# Patient Record
Sex: FEMALE | Race: WHITE | Employment: OTHER | URBAN - METROPOLITAN AREA
[De-identification: names, ages, dates, MRNs, and addresses within clinical notes are randomized per-mention and may not be internally consistent; named-entity substitution may affect disease eponyms.]

---

## 2017-01-04 ENCOUNTER — OFFICE VISIT (OUTPATIENT)
Dept: DERMATOLOGY | Age: 77
End: 2017-01-04

## 2017-01-04 DIAGNOSIS — D22.9 BENIGN NEVUS: ICD-10-CM

## 2017-01-04 DIAGNOSIS — L66.1 LICHEN PLANO-PILARIS: ICD-10-CM

## 2017-01-04 DIAGNOSIS — L57.0 AK (ACTINIC KERATOSIS): Primary | ICD-10-CM

## 2017-01-04 DIAGNOSIS — L71.9 ROSACEA: ICD-10-CM

## 2017-01-04 DIAGNOSIS — Z85.828 HISTORY OF NONMELANOMA SKIN CANCER: ICD-10-CM

## 2017-01-04 PROCEDURE — 17003 DESTRUCT PREMALG LES 2-14: CPT | Performed by: DERMATOLOGY

## 2017-01-04 PROCEDURE — 99202 OFFICE O/P NEW SF 15 MIN: CPT | Performed by: DERMATOLOGY

## 2017-01-04 PROCEDURE — 17000 DESTRUCT PREMALG LESION: CPT | Performed by: DERMATOLOGY

## 2017-07-26 ENCOUNTER — OFFICE VISIT (OUTPATIENT)
Dept: DERMATOLOGY | Age: 77
End: 2017-07-26

## 2017-07-26 DIAGNOSIS — L60.3 ONYCHODYSTROPHY: ICD-10-CM

## 2017-07-26 DIAGNOSIS — L66.1 LICHEN PLANO-PILARIS: ICD-10-CM

## 2017-07-26 DIAGNOSIS — D22.9 BENIGN NEVUS: ICD-10-CM

## 2017-07-26 DIAGNOSIS — L57.0 AK (ACTINIC KERATOSIS): Primary | ICD-10-CM

## 2017-07-26 DIAGNOSIS — Z85.828 HISTORY OF NONMELANOMA SKIN CANCER: ICD-10-CM

## 2017-07-26 PROCEDURE — 1090F PRES/ABSN URINE INCON ASSESS: CPT | Performed by: DERMATOLOGY

## 2017-07-26 PROCEDURE — 17000 DESTRUCT PREMALG LESION: CPT | Performed by: DERMATOLOGY

## 2017-07-26 PROCEDURE — 4040F PNEUMOC VAC/ADMIN/RCVD: CPT | Performed by: DERMATOLOGY

## 2017-07-26 PROCEDURE — G8427 DOCREV CUR MEDS BY ELIG CLIN: HCPCS | Performed by: DERMATOLOGY

## 2017-07-26 PROCEDURE — G8421 BMI NOT CALCULATED: HCPCS | Performed by: DERMATOLOGY

## 2017-07-26 PROCEDURE — 17003 DESTRUCT PREMALG LES 2-14: CPT | Performed by: DERMATOLOGY

## 2017-07-26 PROCEDURE — 1036F TOBACCO NON-USER: CPT | Performed by: DERMATOLOGY

## 2017-07-26 PROCEDURE — 99214 OFFICE O/P EST MOD 30 MIN: CPT | Performed by: DERMATOLOGY

## 2017-07-26 PROCEDURE — G8400 PT W/DXA NO RESULTS DOC: HCPCS | Performed by: DERMATOLOGY

## 2017-07-26 PROCEDURE — 1123F ACP DISCUSS/DSCN MKR DOCD: CPT | Performed by: DERMATOLOGY

## 2017-07-26 RX ORDER — PIMECROLIMUS 10 MG/G
CREAM TOPICAL 2 TIMES DAILY
COMMUNITY
End: 2019-03-13

## 2017-07-26 RX ORDER — VITAMIN B COMPLEX
1 CAPSULE ORAL DAILY
COMMUNITY

## 2017-07-26 RX ORDER — TACROLIMUS 1 MG/G
OINTMENT TOPICAL 2 TIMES DAILY
COMMUNITY
End: 2019-03-13

## 2017-07-26 RX ORDER — METHYLPHENIDATE HYDROCHLORIDE 20 MG/1
20 TABLET ORAL 2 TIMES DAILY
COMMUNITY

## 2017-10-23 ENCOUNTER — HOSPITAL ENCOUNTER (OUTPATIENT)
Dept: MAMMOGRAPHY | Age: 77
Discharge: HOME | End: 2017-10-23
Attending: INTERNAL MEDICINE | Admitting: INTERNAL MEDICINE

## 2017-10-23 DIAGNOSIS — Z12.31 VISIT FOR SCREENING MAMMOGRAM: ICD-10-CM

## 2018-01-29 ENCOUNTER — OFFICE VISIT (OUTPATIENT)
Dept: DERMATOLOGY | Age: 78
End: 2018-01-29

## 2018-01-29 DIAGNOSIS — D22.9 BENIGN NEVUS: ICD-10-CM

## 2018-01-29 DIAGNOSIS — L71.9 ROSACEA: ICD-10-CM

## 2018-01-29 DIAGNOSIS — Z85.828 HISTORY OF NONMELANOMA SKIN CANCER: ICD-10-CM

## 2018-01-29 DIAGNOSIS — L66.1 LICHEN PLANO-PILARIS: ICD-10-CM

## 2018-01-29 DIAGNOSIS — L57.0 AK (ACTINIC KERATOSIS): Primary | ICD-10-CM

## 2018-01-29 DIAGNOSIS — L85.3 XEROSIS CUTIS: ICD-10-CM

## 2018-01-29 PROCEDURE — 17003 DESTRUCT PREMALG LES 2-14: CPT | Performed by: DERMATOLOGY

## 2018-01-29 PROCEDURE — 1123F ACP DISCUSS/DSCN MKR DOCD: CPT | Performed by: DERMATOLOGY

## 2018-01-29 PROCEDURE — G8484 FLU IMMUNIZE NO ADMIN: HCPCS | Performed by: DERMATOLOGY

## 2018-01-29 PROCEDURE — 1090F PRES/ABSN URINE INCON ASSESS: CPT | Performed by: DERMATOLOGY

## 2018-01-29 PROCEDURE — G8427 DOCREV CUR MEDS BY ELIG CLIN: HCPCS | Performed by: DERMATOLOGY

## 2018-01-29 PROCEDURE — G8421 BMI NOT CALCULATED: HCPCS | Performed by: DERMATOLOGY

## 2018-01-29 PROCEDURE — 99214 OFFICE O/P EST MOD 30 MIN: CPT | Performed by: DERMATOLOGY

## 2018-01-29 PROCEDURE — 4040F PNEUMOC VAC/ADMIN/RCVD: CPT | Performed by: DERMATOLOGY

## 2018-01-29 PROCEDURE — G8400 PT W/DXA NO RESULTS DOC: HCPCS | Performed by: DERMATOLOGY

## 2018-01-29 PROCEDURE — 17000 DESTRUCT PREMALG LESION: CPT | Performed by: DERMATOLOGY

## 2018-01-29 PROCEDURE — 1036F TOBACCO NON-USER: CPT | Performed by: DERMATOLOGY

## 2018-01-29 NOTE — PROGRESS NOTES
cutis -Gave suggestions for over-the-counter moisturizers    5. Rosacea -Discussed treatment options. No topical treatment for now unless she develops more inflammatory papules. 6. History of nonmelanoma skin cancer - No evidence of recurrence. Discussed risk of subsequent skin cancers and increased risk of melanoma. Reviewed importance of monitoring skin for change and sun protection with hats and sunscreen, as well as sun avoidance.

## 2018-02-12 ENCOUNTER — TELEPHONE (OUTPATIENT)
Dept: ADMINISTRATIVE | Age: 78
End: 2018-02-12

## 2018-09-19 ENCOUNTER — OFFICE VISIT (OUTPATIENT)
Dept: DERMATOLOGY | Age: 78
End: 2018-09-19

## 2018-09-19 DIAGNOSIS — L82.1 SEBORRHEIC KERATOSES: ICD-10-CM

## 2018-09-19 DIAGNOSIS — Z85.828 HISTORY OF NONMELANOMA SKIN CANCER: ICD-10-CM

## 2018-09-19 DIAGNOSIS — D22.9 BENIGN NEVUS: Primary | ICD-10-CM

## 2018-09-19 DIAGNOSIS — L66.1 LICHEN PLANO-PILARIS: ICD-10-CM

## 2018-09-19 PROCEDURE — 1090F PRES/ABSN URINE INCON ASSESS: CPT | Performed by: DERMATOLOGY

## 2018-09-19 PROCEDURE — G8400 PT W/DXA NO RESULTS DOC: HCPCS | Performed by: DERMATOLOGY

## 2018-09-19 PROCEDURE — 4040F PNEUMOC VAC/ADMIN/RCVD: CPT | Performed by: DERMATOLOGY

## 2018-09-19 PROCEDURE — G8421 BMI NOT CALCULATED: HCPCS | Performed by: DERMATOLOGY

## 2018-09-19 PROCEDURE — G8427 DOCREV CUR MEDS BY ELIG CLIN: HCPCS | Performed by: DERMATOLOGY

## 2018-09-19 PROCEDURE — 1036F TOBACCO NON-USER: CPT | Performed by: DERMATOLOGY

## 2018-09-19 PROCEDURE — 99213 OFFICE O/P EST LOW 20 MIN: CPT | Performed by: DERMATOLOGY

## 2018-09-19 PROCEDURE — 1123F ACP DISCUSS/DSCN MKR DOCD: CPT | Performed by: DERMATOLOGY

## 2018-09-19 PROCEDURE — 1101F PT FALLS ASSESS-DOCD LE1/YR: CPT | Performed by: DERMATOLOGY

## 2018-09-19 RX ORDER — CLOBETASOL PROPIONATE 0.5 MG/G
CREAM TOPICAL 2 TIMES DAILY
COMMUNITY
End: 2019-03-13

## 2018-09-19 NOTE — PROGRESS NOTES
[facility]) Dermatology  Latanya Rosa M.D.  [phone]       [facility]  1940    77 y.o. female     Date of Visit: 9/19/2018    Chief Complaint:   Chief Complaint   Patient presents with    Skin Exam     TBSE no new concer        I was asked to see this patient by Dr. Garcia ref. provider found. History of Present Illness:  1. Total body skin exam    Patient has not noticed any new or changing lesions. Does monitor her skin for change. Does practice sun avoidance      Skin history:  3 prior nonmelanoma skin cancers-forehead, left upper lip, left thigh     Unhappy with scar left upper lip-feels that it changed the contour of her lip. Consider plastics for further excisions     AK- LN2     Vascular rosacea. Has tried multiple topicals. Never had laser     Hair loss-previously diagnosed as lichen planopilaris. Previous treatments included intralesional Kenalog, Protopic, Elidel. Was seen by ophthalmology for pre-Plaquenil eye exam and thought not to be a good candidate due to early macular degeneration     Former patient of Merced Tenorio and Lupe Grimes    Review of Systems:  Constitutional: Reports general sense of well-being       Past Medical History, Surgical History, Family History, Medications and Allergies reviewed. Social History:   Social History     Social History    Marital status:      Spouse name: N/A    Number of children: N/A    Years of education: N/A     Occupational History    Not on file. Social History Main Topics    Smoking status: Never Smoker    Smokeless tobacco: Never Used    Alcohol use Not on file    Drug use: Unknown    Sexual activity: Not on file     Other Topics Concern    Not on file     Social History Narrative    No narrative on file       Physical Examination       -General: Well-appearing, NAD  1. Normal affect.   Total body skin exam including scalp, face, neck, chest, abdomen, back, bilateral upper extremities, bilateral lower extremities, ocular conjunctiva, external lips, and nails was performed. Examination normal unless stated below. Underwear area not examined. Scattered on the trunk and extremities are multiple well-defined round and oval symmetric smoothly-bordered uniformly brown macules and papules. Scattered hyperkeratotic stuck on papules and plaques over her torso. Multiple erythematous follicular lesion centered papules throughout her scalp especially anteriorly consistent with history of lichen planopilaris. No sign of recurrence at sites of prior skin cancers      Assessment and Plan     1. 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 Seborrheic keratoses -Monitor for change    4. History of nonmelanoma skin cancer - No evidence of recurrence. Discussed risk of subsequent skin cancers and increased risk of melanoma. Reviewed importance of monitoring skin for change and sun protection with hats and sunscreen, as well as sun avoidance.

## 2018-10-24 ENCOUNTER — HOSPITAL ENCOUNTER (OUTPATIENT)
Dept: MAMMOGRAPHY | Age: 78
Discharge: HOME | End: 2018-10-24
Payer: MEDICARE

## 2018-10-24 DIAGNOSIS — Z12.31 VISIT FOR SCREENING MAMMOGRAM: ICD-10-CM

## 2018-10-24 PROCEDURE — 77063 BREAST TOMOSYNTHESIS BI: CPT

## 2019-03-13 ENCOUNTER — OFFICE VISIT (OUTPATIENT)
Dept: DERMATOLOGY | Age: 79
End: 2019-03-13
Payer: MEDICARE

## 2019-03-13 DIAGNOSIS — L66.1 LICHEN PLANO-PILARIS: ICD-10-CM

## 2019-03-13 DIAGNOSIS — Z85.828 HISTORY OF NONMELANOMA SKIN CANCER: ICD-10-CM

## 2019-03-13 DIAGNOSIS — L30.8 OTHER ECZEMA: Primary | ICD-10-CM

## 2019-03-13 DIAGNOSIS — D22.9 BENIGN NEVUS: ICD-10-CM

## 2019-03-13 PROCEDURE — G8400 PT W/DXA NO RESULTS DOC: HCPCS | Performed by: DERMATOLOGY

## 2019-03-13 PROCEDURE — 1090F PRES/ABSN URINE INCON ASSESS: CPT | Performed by: DERMATOLOGY

## 2019-03-13 PROCEDURE — 1123F ACP DISCUSS/DSCN MKR DOCD: CPT | Performed by: DERMATOLOGY

## 2019-03-13 PROCEDURE — G8421 BMI NOT CALCULATED: HCPCS | Performed by: DERMATOLOGY

## 2019-03-13 PROCEDURE — G8484 FLU IMMUNIZE NO ADMIN: HCPCS | Performed by: DERMATOLOGY

## 2019-03-13 PROCEDURE — 1101F PT FALLS ASSESS-DOCD LE1/YR: CPT | Performed by: DERMATOLOGY

## 2019-03-13 PROCEDURE — G8427 DOCREV CUR MEDS BY ELIG CLIN: HCPCS | Performed by: DERMATOLOGY

## 2019-03-13 PROCEDURE — 99214 OFFICE O/P EST MOD 30 MIN: CPT | Performed by: DERMATOLOGY

## 2019-03-13 PROCEDURE — 4040F PNEUMOC VAC/ADMIN/RCVD: CPT | Performed by: DERMATOLOGY

## 2019-03-13 PROCEDURE — 1036F TOBACCO NON-USER: CPT | Performed by: DERMATOLOGY

## 2019-09-25 ENCOUNTER — OFFICE VISIT (OUTPATIENT)
Dept: DERMATOLOGY | Age: 79
End: 2019-09-25
Payer: MEDICARE

## 2019-09-25 DIAGNOSIS — D22.9 BENIGN NEVUS: ICD-10-CM

## 2019-09-25 DIAGNOSIS — Z85.828 HISTORY OF NONMELANOMA SKIN CANCER: ICD-10-CM

## 2019-09-25 DIAGNOSIS — L57.0 KERATOSIS, ACTINIC: Primary | ICD-10-CM

## 2019-09-25 PROCEDURE — G8427 DOCREV CUR MEDS BY ELIG CLIN: HCPCS | Performed by: DERMATOLOGY

## 2019-09-25 PROCEDURE — 4040F PNEUMOC VAC/ADMIN/RCVD: CPT | Performed by: DERMATOLOGY

## 2019-09-25 PROCEDURE — 1090F PRES/ABSN URINE INCON ASSESS: CPT | Performed by: DERMATOLOGY

## 2019-09-25 PROCEDURE — G8421 BMI NOT CALCULATED: HCPCS | Performed by: DERMATOLOGY

## 2019-09-25 PROCEDURE — 99213 OFFICE O/P EST LOW 20 MIN: CPT | Performed by: DERMATOLOGY

## 2019-09-25 PROCEDURE — 17000 DESTRUCT PREMALG LESION: CPT | Performed by: DERMATOLOGY

## 2019-09-25 PROCEDURE — G8400 PT W/DXA NO RESULTS DOC: HCPCS | Performed by: DERMATOLOGY

## 2019-09-25 PROCEDURE — 1036F TOBACCO NON-USER: CPT | Performed by: DERMATOLOGY

## 2019-09-25 PROCEDURE — 1123F ACP DISCUSS/DSCN MKR DOCD: CPT | Performed by: DERMATOLOGY

## 2019-09-25 RX ORDER — MOMETASONE FUROATE 1 MG/ML
SOLUTION TOPICAL DAILY
COMMUNITY

## 2019-09-25 NOTE — PROGRESS NOTES
 Drug use: Never    Sexual activity: Not on file   Lifestyle    Physical activity:     Days per week: Not on file     Minutes per session: Not on file    Stress: Not on file   Relationships    Social connections:     Talks on phone: Not on file     Gets together: Not on file     Attends Yazdanism service: Not on file     Active member of club or organization: Not on file     Attends meetings of clubs or organizations: Not on file     Relationship status: Not on file    Intimate partner violence:     Fear of current or ex partner: Not on file     Emotionally abused: Not on file     Physically abused: Not on file     Forced sexual activity: Not on file   Other Topics Concern    Not on file   Social History Narrative    Not on file       Physical Examination       -General: Well-appearing, NAD  1. Normal affect. Total body skin exam including scalp, face, neck, chest, abdomen, back, bilateral upper extremities, bilateral lower extremities, ocular conjunctiva, external lips, and nails was performed. Examination normal unless stated below. Underwear area not examined. Scattered on the trunk and extremities are multiple well-defined round and oval symmetric smoothly-bordered uniformly brown macules and papules. Gritty pink hypertrophic papule right nasal sidewall, 8 mm  Multiple well-healed scars no sign of recurrence    Assessment and Plan     1. Keratosis, actinic -right nasal sidewall-1 lesion(s) treated w/ liquid nitrogen. Edu re: risk of blister formation, discomfort, scar, hypopigmentation. Discussed wound care.       2. Benign nevus - Benign acquired melanocytic nevi  -Recommend monthly self skin exams   -Educated regarding the ABCDEs of melanoma detection   -Call for any new/changing moles or concerning lesions  -Reviewed sun protective behavior -- sun avoidance during the peak hours of the day, sun-protective clothing (including hat and sunglasses), sunscreen use (water resistant, broad spectrum,

## 2019-10-23 ENCOUNTER — HOSPITAL ENCOUNTER (OUTPATIENT)
Dept: MAMMOGRAPHY | Age: 79
Discharge: HOME | End: 2019-10-23
Payer: MEDICARE

## 2019-10-23 DIAGNOSIS — Z12.31 VISIT FOR SCREENING MAMMOGRAM: ICD-10-CM

## 2019-10-23 DIAGNOSIS — Z85.3 PERSONAL HISTORY OF BREAST CANCER: ICD-10-CM

## 2019-10-23 PROCEDURE — 77063 BREAST TOMOSYNTHESIS BI: CPT

## 2019-11-21 ENCOUNTER — OFFICE VISIT (OUTPATIENT)
Dept: DERMATOLOGY | Age: 79
End: 2019-11-21
Payer: MEDICARE

## 2019-11-21 DIAGNOSIS — D48.5 NEOPLASM OF UNCERTAIN BEHAVIOR OF SKIN: Primary | ICD-10-CM

## 2019-11-21 PROCEDURE — 11102 TANGNTL BX SKIN SINGLE LES: CPT | Performed by: DERMATOLOGY

## 2019-11-26 LAB — DERMATOLOGY PATHOLOGY REPORT: NORMAL

## 2020-10-14 ENCOUNTER — OFFICE VISIT (OUTPATIENT)
Dept: DERMATOLOGY | Age: 80
End: 2020-10-14
Payer: MEDICARE

## 2020-10-14 VITALS — TEMPERATURE: 97.4 F

## 2020-10-14 PROCEDURE — 1090F PRES/ABSN URINE INCON ASSESS: CPT | Performed by: DERMATOLOGY

## 2020-10-14 PROCEDURE — G8421 BMI NOT CALCULATED: HCPCS | Performed by: DERMATOLOGY

## 2020-10-14 PROCEDURE — G8400 PT W/DXA NO RESULTS DOC: HCPCS | Performed by: DERMATOLOGY

## 2020-10-14 PROCEDURE — G8427 DOCREV CUR MEDS BY ELIG CLIN: HCPCS | Performed by: DERMATOLOGY

## 2020-10-14 PROCEDURE — 1123F ACP DISCUSS/DSCN MKR DOCD: CPT | Performed by: DERMATOLOGY

## 2020-10-14 PROCEDURE — G8484 FLU IMMUNIZE NO ADMIN: HCPCS | Performed by: DERMATOLOGY

## 2020-10-14 PROCEDURE — 4040F PNEUMOC VAC/ADMIN/RCVD: CPT | Performed by: DERMATOLOGY

## 2020-10-14 PROCEDURE — 99213 OFFICE O/P EST LOW 20 MIN: CPT | Performed by: DERMATOLOGY

## 2020-10-14 PROCEDURE — 17000 DESTRUCT PREMALG LESION: CPT | Performed by: DERMATOLOGY

## 2020-10-14 PROCEDURE — 1036F TOBACCO NON-USER: CPT | Performed by: DERMATOLOGY

## 2020-10-14 NOTE — PROGRESS NOTES
[facility]) Dermatology  Kasandra Rosen M.D.  [phone]       Dieudonne Dodson  1940    79 y.o. female     Date of Visit: 10/14/2020    Chief Complaint:   Chief Complaint   Patient presents with    Skin Lesion     6 mo FSE        I was asked to see this patient by Dr. Garcia ref. provider found. History of Present Illness:  1. Total-body skin exam    Multiple nevi-stable in size, shape, color. Has not noticed any new or changing pigmented lesions    Dry papule left eyebrow-remains scaly but not itching, bleeding. Asymptomatic. Lichen plano pilaris-had plan to follow-up with Dr. Shabana Muhammad. Uncertain if she plans to make a new appointment or not-follow-up was deferred due to COVID    Skin history:  3 prior nonmelanoma skin cancers-forehead, left upper lip, left thigh     Unhappy with scar left upper lip-feels that it changed the contour of her lip.  Consider plastics for further excisions     AK- LN2     Vascular rosacea.  Has tried multiple topicals.  Never had laser     Hair loss-previously diagnosed as lichen planopilaris.  Previous treatments included intralesional Kenalog, Protopic, Elidel.  Was seen by ophthalmology for pre-Plaquenil eye exam and thought not to be a good candidate due to early macular degeneration     Former patient of Catarina Rodriguez    Review of Systems:  Constitutional: Reports general sense of well-being       Past Medical History, Surgical History, Family History, Medications and Allergies reviewed.     Social History:   Social History     Socioeconomic History    Marital status:      Spouse name: Not on file    Number of children: Not on file    Years of education: Not on file    Highest education level: Not on file   Occupational History    Not on file   Social Needs    Financial resource strain: Not on file    Food insecurity     Worry: Not on file     Inability: Not on file    Transportation needs     Medical: Not on file     Non-medical: Not on file Tobacco Use    Smoking status: Never Smoker    Smokeless tobacco: Never Used   Substance and Sexual Activity    Alcohol use: Yes    Drug use: Never    Sexual activity: Not on file   Lifestyle    Physical activity     Days per week: Not on file     Minutes per session: Not on file    Stress: Not on file   Relationships    Social connections     Talks on phone: Not on file     Gets together: Not on file     Attends Uatsdin service: Not on file     Active member of club or organization: Not on file     Attends meetings of clubs or organizations: Not on file     Relationship status: Not on file    Intimate partner violence     Fear of current or ex partner: Not on file     Emotionally abused: Not on file     Physically abused: Not on file     Forced sexual activity: Not on file   Other Topics Concern    Not on file   Social History Narrative    Not on file       Physical Examination       -General: Well-appearing, NAD  1. Normal affect. Total body skin exam including scalp, face, neck, chest, abdomen, back, bilateral upper extremities, bilateral lower extremities, ocular conjunctiva, external lips, and nails was performed. Examination normal unless stated below. Underwear area not examined. Scattered on the trunk and extremities are multiple well-defined round and oval symmetric smoothly-bordered uniformly brown macules and papules. Well-healed scars no sign of recurrent  Gritty erythematous plaque left supra brow      Assessment and Plan     1. Keratosis, actinic - -1 left supra eyebrow lesion(s) treated w/ liquid nitrogen. Edu re: risk of blister formation, discomfort, scar, hypopigmentation. Discussed wound care.       2. Benign nevus - Benign acquired melanocytic nevi  -Recommend monthly self skin exams   -Educated regarding the ABCDEs of melanoma detection   -Call for any new/changing moles or concerning lesions  -Reviewed sun protective behavior -- sun avoidance during the peak hours of the day, sun-protective clothing (including hat and sunglasses), sunscreen use (water resistant, broad spectrum, SPF at least 30, need for reapplication every 2 to 3 hours), avoidance of tanning beds      3. History of nonmelanoma skin cancer - No evidence of recurrence. Discussed risk of subsequent skin cancers and increased risk of melanoma. Reviewed importance of monitoring skin for change and sun protection with hats and sunscreen, as well as sun avoidance.

## 2020-10-26 ENCOUNTER — HOSPITAL ENCOUNTER (OUTPATIENT)
Dept: MAMMOGRAPHY | Age: 80
Discharge: HOME | End: 2020-10-26
Payer: MEDICARE

## 2020-10-26 PROCEDURE — 77063 BREAST TOMOSYNTHESIS BI: CPT

## 2021-04-14 ENCOUNTER — OFFICE VISIT (OUTPATIENT)
Dept: DERMATOLOGY | Age: 81
End: 2021-04-14
Payer: MEDICARE

## 2021-04-14 VITALS — TEMPERATURE: 97 F

## 2021-04-14 DIAGNOSIS — L57.0 KERATOSIS, ACTINIC: Primary | ICD-10-CM

## 2021-04-14 DIAGNOSIS — D22.9 BENIGN NEVUS: ICD-10-CM

## 2021-04-14 DIAGNOSIS — L84 CORN: ICD-10-CM

## 2021-04-14 DIAGNOSIS — L85.3 ASTEATOSIS: ICD-10-CM

## 2021-04-14 DIAGNOSIS — B35.1 ONYCHOMYCOSIS: ICD-10-CM

## 2021-04-14 DIAGNOSIS — Z85.828 HISTORY OF NONMELANOMA SKIN CANCER: ICD-10-CM

## 2021-04-14 PROCEDURE — 1123F ACP DISCUSS/DSCN MKR DOCD: CPT | Performed by: DERMATOLOGY

## 2021-04-14 PROCEDURE — G8427 DOCREV CUR MEDS BY ELIG CLIN: HCPCS | Performed by: DERMATOLOGY

## 2021-04-14 PROCEDURE — 4040F PNEUMOC VAC/ADMIN/RCVD: CPT | Performed by: DERMATOLOGY

## 2021-04-14 PROCEDURE — 17000 DESTRUCT PREMALG LESION: CPT | Performed by: DERMATOLOGY

## 2021-04-14 PROCEDURE — 1090F PRES/ABSN URINE INCON ASSESS: CPT | Performed by: DERMATOLOGY

## 2021-04-14 PROCEDURE — G8421 BMI NOT CALCULATED: HCPCS | Performed by: DERMATOLOGY

## 2021-04-14 PROCEDURE — G8400 PT W/DXA NO RESULTS DOC: HCPCS | Performed by: DERMATOLOGY

## 2021-04-14 PROCEDURE — 1036F TOBACCO NON-USER: CPT | Performed by: DERMATOLOGY

## 2021-04-14 PROCEDURE — 99214 OFFICE O/P EST MOD 30 MIN: CPT | Performed by: DERMATOLOGY

## 2021-04-14 RX ORDER — TRIAMCINOLONE ACETONIDE 1 MG/G
CREAM TOPICAL
Qty: 80 G | Refills: 2 | Status: SHIPPED | OUTPATIENT
Start: 2021-04-14

## 2021-04-14 NOTE — PROGRESS NOTES
[facility]) Dermatology  Misty Almeida M.D.  [phone]       Get Gomez  1940    79 y.o. female     Date of Visit: 4/14/2021    Chief Complaint:   Chief Complaint   Patient presents with    Skin Lesion        I was asked to see this patient by Dr. Garcia ref. provider found. History of Present Illness:  1. Total-body skin exam    Dry papule left mid eyebrow-dry but not itching, bleeding. Nontender. Not increasing in size    Tender keratotic papule plantar surface right anterior foot. Mildly tender when she walks. Not previously treated. Has also noticed thickening and subungual debris great toenail. Pruritus of her upper back-tries to moisturize. Difficult to reach this area. Scratching as she is able. Not previously treated. Skin history:  3 prior nonmelanoma skin cancers-forehead, left upper lip, left thigh     Unhappy with scar left upper lip-feels that it changed the contour of her lip.  Consider plastics for further excisions     AK- LN2     Vascular rosacea.  Has tried multiple topicals.  Never had laser     Hair loss-previously diagnosed as lichen planopilaris.  Previous treatments included intralesional Kenalog, Protopic, Elidel.  Was seen by ophthalmology for pre-Plaquenil eye exam and thought not to be a good candidate due to early macular degeneration     Former patient of Buddy Mcnally    Review of Systems:  Constitutional: Reports general sense of well-being       Past Medical History, Surgical History, Family History, Medications and Allergies reviewed.     Social History:   Social History     Socioeconomic History    Marital status:      Spouse name: Not on file    Number of children: Not on file    Years of education: Not on file    Highest education level: Not on file   Occupational History    Not on file   Social Needs    Financial resource strain: Not on file    Food insecurity     Worry: Not on file     Inability: Not on file   Catie Dugan Transportation needs     Medical: Not on file     Non-medical: Not on file   Tobacco Use    Smoking status: Never Smoker    Smokeless tobacco: Never Used   Substance and Sexual Activity    Alcohol use: Yes    Drug use: Never    Sexual activity: Not on file   Lifestyle    Physical activity     Days per week: Not on file     Minutes per session: Not on file    Stress: Not on file   Relationships    Social connections     Talks on phone: Not on file     Gets together: Not on file     Attends Worship service: Not on file     Active member of club or organization: Not on file     Attends meetings of clubs or organizations: Not on file     Relationship status: Not on file    Intimate partner violence     Fear of current or ex partner: Not on file     Emotionally abused: Not on file     Physically abused: Not on file     Forced sexual activity: Not on file   Other Topics Concern    Not on file   Social History Narrative    Not on file       Physical Examination       -General: Well-appearing, NAD  1. Normal affect. Total body skin exam including scalp, face, neck, chest, abdomen, back, bilateral upper extremities, bilateral lower extremities, ocular conjunctiva, external lips, and nails was performed. Examination normal unless stated below. Underwear area not examined. Scattered on the trunk and extremities are multiple well-defined round and oval symmetric smoothly-bordered uniformly brown macules and papules. Multiple well-healed scars  Gritty erythematous papule left mid eyebrow  Tender keratotic papule right anterior plantar foot 4 mm-corn. Thickening and subungual debris left great toenail  Mild background asteatosis-xerosis with mild erythema mid upper back. Excoriations. Assessment and Plan     1. Keratosis, actinic -left mid eyebrow-one lesion(s) treated w/ liquid nitrogen. Edu re: risk of blister formation, discomfort, scar, hypopigmentation. Discussed wound care.       2. Corn-discussed

## 2021-09-02 ENCOUNTER — TELEPHONE (OUTPATIENT)
Dept: DERMATOLOGY | Age: 81
End: 2021-09-02

## 2021-09-02 NOTE — TELEPHONE ENCOUNTER
Pt of Dr. Marcos Hernandez   Pt call and stated that the procedure that was done awhile has crusted and scabed over, would like for it to be checked out . I advised pt she has an appt on 10/20 she will like something earlier. Please advise. Thank you.

## 2021-09-03 NOTE — TELEPHONE ENCOUNTER
Returned call to patient, offered 9-9- at 11:30 or 11:45. She thinks it is getting better for now and can wait until her appt. 10-20-21  Advised her to try a little Aquaphor on area to soothe dryness.

## 2021-10-20 ENCOUNTER — OFFICE VISIT (OUTPATIENT)
Dept: DERMATOLOGY | Age: 81
End: 2021-10-20
Payer: MEDICARE

## 2021-10-20 DIAGNOSIS — Z85.828 HISTORY OF NONMELANOMA SKIN CANCER: ICD-10-CM

## 2021-10-20 DIAGNOSIS — D22.9 BENIGN NEVUS: ICD-10-CM

## 2021-10-20 DIAGNOSIS — L66.1 LICHEN PLANOPILARIS: ICD-10-CM

## 2021-10-20 DIAGNOSIS — L57.0 KERATOSIS, ACTINIC: Primary | ICD-10-CM

## 2021-10-20 PROCEDURE — G8400 PT W/DXA NO RESULTS DOC: HCPCS | Performed by: DERMATOLOGY

## 2021-10-20 PROCEDURE — 1090F PRES/ABSN URINE INCON ASSESS: CPT | Performed by: DERMATOLOGY

## 2021-10-20 PROCEDURE — 17000 DESTRUCT PREMALG LESION: CPT | Performed by: DERMATOLOGY

## 2021-10-20 PROCEDURE — 1036F TOBACCO NON-USER: CPT | Performed by: DERMATOLOGY

## 2021-10-20 PROCEDURE — 4040F PNEUMOC VAC/ADMIN/RCVD: CPT | Performed by: DERMATOLOGY

## 2021-10-20 PROCEDURE — 99214 OFFICE O/P EST MOD 30 MIN: CPT | Performed by: DERMATOLOGY

## 2021-10-20 PROCEDURE — G8421 BMI NOT CALCULATED: HCPCS | Performed by: DERMATOLOGY

## 2021-10-20 PROCEDURE — G8427 DOCREV CUR MEDS BY ELIG CLIN: HCPCS | Performed by: DERMATOLOGY

## 2021-10-20 PROCEDURE — 17003 DESTRUCT PREMALG LES 2-14: CPT | Performed by: DERMATOLOGY

## 2021-10-20 PROCEDURE — G8484 FLU IMMUNIZE NO ADMIN: HCPCS | Performed by: DERMATOLOGY

## 2021-10-20 PROCEDURE — 1123F ACP DISCUSS/DSCN MKR DOCD: CPT | Performed by: DERMATOLOGY

## 2021-10-20 NOTE — PROGRESS NOTES
[facility]) Dermatology  Ramona Woodruff M.D.  [phone]       Makenzie Quick  1940    79 y.o. female     Date of Visit: 10/20/2021    Chief Complaint:   Chief Complaint   Patient presents with    Skin Lesion        I was asked to see this patient by Dr. Garcia ref. provider found. History of Present Illness:  1. Total-body skin exam    Patient would like to revisit hair loss-8 to 9-year history of hair loss, initially started with loss of eyebrows followed by progressive regression of anterior hairline, prominence of vasculature on forehead. Clinical picture consistent with lichen plano pilaris-frontal fibrosing alopecia variant. Has been treated by 3 prior dermatologist, 2 at Dr. Megan Gomez office and [facility]. Treatment has included topical Elidel, Protopic, intralesional Kenalog. Plaquenil was thought not to be in a good option and patient was not interested in Plaquenil due to risk of ocular toxicity. Patient states she has noted further regression of her hairline. Hair loss is somewhat bothersome to her. Also would like to discuss prior skin lesions treated-she had previously reported to me a history of basal cell carcinoma. States after further discussion that they were actinic keratoses. All treated with liquid nitrogen. Has previously been unhappy with scars. Has noted a dry papule in her left eyebrow, right eyebrow, left forehead, left upper cutaneous lip. Remained persistently dry. Not itching, bleeding. Of note, patient unhappy with various aspects of all 3 prior dermatologist-scar mention previously, approach to hair loss, lack of photographs, perceived lack of treatment options all mentioned today. Skin history:  3 prior nonmelanoma skin cancers-forehead, left upper lip, left thigh     Unhappy with scar left upper lip-feels that it changed the contour of her lip.  Consider plastics for further excisions     AK- LN2     Vascular rosacea.  Has tried multiple topicals.  Never had laser     Hair loss-previously diagnosed as lichen planopilaris.  Previous treatments included intralesional Kenalog, Protopic, Elidel.  Was seen by ophthalmology for pre-Plaquenil eye exam and thought not to be a good candidate due to early macular degeneration     Former patient of Violeta     Review of Systems:  Constitutional: Reports general sense of well-being       Past Medical History, Surgical History, Family History, Medications and Allergies reviewed. Social History:   Social History     Socioeconomic History    Marital status:      Spouse name: Not on file    Number of children: Not on file    Years of education: Not on file    Highest education level: Not on file   Occupational History    Not on file   Tobacco Use    Smoking status: Never Smoker    Smokeless tobacco: Never Used   Vaping Use    Vaping Use: Never used   Substance and Sexual Activity    Alcohol use: Yes    Drug use: Never    Sexual activity: Not on file   Other Topics Concern    Not on file   Social History Narrative    Not on file     Social Determinants of Health     Financial Resource Strain:     Difficulty of Paying Living Expenses:    Food Insecurity:     Worried About Running Out of Food in the Last Year:     [address] in the Last Year:    Transportation Needs:     Lack of Transportation (Medical):      Lack of Transportation (Non-Medical):    Physical Activity:     Days of Exercise per Week:     Minutes of Exercise per Session:    Stress:     Feeling of Stress :    Social Connections:     Frequency of Communication with Friends and Family:     Frequency of Social Gatherings with Friends and Family:     Attends Jain Services:     Active Member of Clubs or Organizations:     Attends Club or Organization Meetings:     Marital Status:    Intimate Partner Violence:     Fear of Current or Ex-Partner:     Emotionally Abused:     Physically Abused:     Sexually Abused:        Physical Examination       -General: Well-appearing, NAD  1. Normal affect. Total body skin exam including scalp, face, neck, chest, abdomen, back, bilateral upper extremities, bilateral lower extremities, ocular conjunctiva, external lips, and nails was performed. Examination normal unless stated below. Underwear area not examined. Scattered on the trunk and extremities are multiple well-defined round and oval symmetric smoothly-bordered uniformly brown macules and papules. Gritty erythematous papule left eyebrow x2, right eyebrow, left upper cutaneous lip, left forehead  Bandlike follicularly centered erythematous papules across her frontal scalp with prominent vasculature on forehead and loss of eyebrows      Assessment and Plan     1. Keratosis, actinic - 5-face lesion(s) treated w/ liquid nitrogen. Edu re: risk of blister formation, discomfort, scar, hypopigmentation. Discussed wound care. 2. Benign nevus - Benign acquired melanocytic nevi  -Recommend monthly self skin exams   -Educated regarding the ABCDEs of melanoma detection   -Call for any new/changing moles or concerning lesions  -Reviewed sun protective behavior -- sun avoidance during the peak hours of the day, sun-protective clothing (including hat and sunglasses), sunscreen use (water resistant, broad spectrum, SPF at least 30, need for reapplication every 2 to 3 hours), avoidance of tanning beds      3. Lichen planopilaris -chronic problem, progressing discussed options for treatment with patient including serial intralesional Kenalog. Also discussed Plaquenil and risk of ocular toxicity. I am somewhat concerned that she will not be happy with this approach either as she has not been happy with previous treatment-May benefit from reassurance of consult or and/or treatment at tertiary referral center where there is a dermatologist who focuses on hair loss. Patient would like to consider this.    4. History of nonmelanoma skin cancer-uncertain but previously reported history was fully accurate-May have a history of actinic keratoses.   Recommended skin exam in 1 year or sooner if needed

## 2021-10-27 ENCOUNTER — HOSPITAL ENCOUNTER (OUTPATIENT)
Dept: MAMMOGRAPHY | Age: 81
Discharge: HOME | End: 2021-10-27
Payer: MEDICARE

## 2021-10-27 VITALS — HEIGHT: 62 IN | BODY MASS INDEX: 20.06 KG/M2 | WEIGHT: 109 LBS

## 2021-10-27 DIAGNOSIS — Z12.31 VISIT FOR SCREENING MAMMOGRAM: ICD-10-CM

## 2021-10-27 PROCEDURE — 77063 BREAST TOMOSYNTHESIS BI: CPT

## 2022-10-28 ENCOUNTER — HOSPITAL ENCOUNTER (OUTPATIENT)
Dept: MAMMOGRAPHY | Age: 82
Discharge: HOME | End: 2022-10-28
Payer: MEDICARE

## 2022-10-28 VITALS — HEIGHT: 62 IN | WEIGHT: 109 LBS | BODY MASS INDEX: 20.06 KG/M2

## 2022-10-28 DIAGNOSIS — Z12.31 VISIT FOR SCREENING MAMMOGRAM: ICD-10-CM

## 2022-10-28 PROCEDURE — 77063 BREAST TOMOSYNTHESIS BI: CPT

## 2022-11-21 ENCOUNTER — OFFICE VISIT (OUTPATIENT)
Dept: DERMATOLOGY | Age: 82
End: 2022-11-21
Payer: MEDICARE

## 2022-11-21 DIAGNOSIS — R23.8 PAPULE: ICD-10-CM

## 2022-11-21 DIAGNOSIS — L57.0 KERATOSIS, ACTINIC: Primary | ICD-10-CM

## 2022-11-21 DIAGNOSIS — D22.9 BENIGN NEVUS: ICD-10-CM

## 2022-11-21 DIAGNOSIS — L82.1 SEBORRHEIC KERATOSES: ICD-10-CM

## 2022-11-21 DIAGNOSIS — Z85.828 HISTORY OF NONMELANOMA SKIN CANCER: ICD-10-CM

## 2022-11-21 PROCEDURE — 1036F TOBACCO NON-USER: CPT | Performed by: DERMATOLOGY

## 2022-11-21 PROCEDURE — 99213 OFFICE O/P EST LOW 20 MIN: CPT | Performed by: DERMATOLOGY

## 2022-11-21 PROCEDURE — 17003 DESTRUCT PREMALG LES 2-14: CPT | Performed by: DERMATOLOGY

## 2022-11-21 PROCEDURE — 1123F ACP DISCUSS/DSCN MKR DOCD: CPT | Performed by: DERMATOLOGY

## 2022-11-21 PROCEDURE — G8427 DOCREV CUR MEDS BY ELIG CLIN: HCPCS | Performed by: DERMATOLOGY

## 2022-11-21 PROCEDURE — G8400 PT W/DXA NO RESULTS DOC: HCPCS | Performed by: DERMATOLOGY

## 2022-11-21 PROCEDURE — G8484 FLU IMMUNIZE NO ADMIN: HCPCS | Performed by: DERMATOLOGY

## 2022-11-21 PROCEDURE — 1090F PRES/ABSN URINE INCON ASSESS: CPT | Performed by: DERMATOLOGY

## 2022-11-21 PROCEDURE — G8420 CALC BMI NORM PARAMETERS: HCPCS | Performed by: DERMATOLOGY

## 2022-11-21 PROCEDURE — 17000 DESTRUCT PREMALG LESION: CPT | Performed by: DERMATOLOGY

## 2022-11-21 NOTE — PROGRESS NOTES
[facility]) Dermatology  Eliza Zendejas M.D.  [phone]       Pan Li  1940    79 y.o. female     Date of Visit: 11/21/2022    Chief Complaint:   Chief Complaint   Patient presents with    Skin Exam     Tbse + top lip         I was asked to see this patient by Dr. Garcia ref. provider found. History of Present Illness:  1. Total-body skin exam    Multiple nevi. Patient has not noticed any new or changing pigmented lesions. Stable in size, shape, color. Not itching, bleeding. Seborrheic keratoses. Increasing number of hyperkeratotic stuck on papules and plaques over the torso. No discrete lesion itching, bleeding, becoming symptomatic. Patient notes dry papule at superior portion of gluteal crease. Has been treated by her gynecologist for lichen sclerosis on and off for the last 2 years-her initial gynecologist retired, then she has a new gynecologist.  Patient uncertain if this area had been treated. She has just noticed this in the last couple of months. Nontender, not itching, bleeding. Patient also notes actinic keratosis left upper cutaneous lip that persisted despite treatment with liquid nitrogen at her last visit. Not increasing in size, just remained dry. Not itching, bleeding. Of note, patient unhappy with various aspects of care from all 3 prior dermatologists-scars mention previously in my notes, approach to hair loss, lack of photographs, perceived lack of treatment options all mentioned today. Skin history:  3 prior nonmelanoma skin cancers-forehead, left upper lip, left thigh     Unhappy with scar left upper lip-feels that it changed the contour of her lip. Consider plastics for further excisions     AK- LN2     Vascular rosacea. Has tried multiple topicals. Never had laser     Hair loss-previously diagnosed as lichen planopilaris. Previous treatments included intralesional Kenalog, Protopic, Elidel.   Was seen by ophthalmology for pre-Plaquenil eye exam and thought not to be a good candidate due to early macular degeneration (see summary 10/21)     Former patient of Cj Ortiz Masters    Review of Systems:  Constitutional: Reports general sense of well-being       Past Medical History, Surgical History, Family History, Medications and Allergies reviewed. Social History:   Social History     Socioeconomic History    Marital status:      Spouse name: Not on file    Number of children: Not on file    Years of education: Not on file    Highest education level: Not on file   Occupational History    Not on file   Tobacco Use    Smoking status: Never    Smokeless tobacco: Never   Vaping Use    Vaping Use: Never used   Substance and Sexual Activity    Alcohol use: Yes    Drug use: Never    Sexual activity: Not on file   Other Topics Concern    Not on file   Social History Narrative    Not on file     Social Determinants of Health     Financial Resource Strain: Not on file   Food Insecurity: Not on file   Transportation Needs: Not on file   Physical Activity: Not on file   Stress: Not on file   Social Connections: Not on file   Intimate Partner Violence: Not on file   Housing Stability: Not on file       Physical Examination       -General: Well-appearing, NAD  1. Normal affect. Total body skin exam including scalp, face, neck, chest, abdomen, back, bilateral upper extremities, bilateral lower extremities, ocular conjunctiva, external lips, and nails was performed. Examination normal unless stated below. Underwear area not examined. Scattered on the trunk and extremities are multiple well-defined round and oval symmetric smoothly-bordered uniformly brown macules and papules. 3 mm gritty erythematous papule left upper cutaneous lip. Gritty erythematous papule right nasal sidewall, right nasal dorsum. Multiple hyperkeratotic stuck on papules and plaques torso    Superior portion of gluteal crease with annular erythematous plaque-slight scale.     Of note, patient developed some left chest discomfort during exam-states that it felt like gas or she needed to burp. No chest pain, no pain in her left arm, no diaphoresis, shortness of breath. Assessment and Plan     1. Keratosis, actinic -3-right nasal sidewall, right nasal dorsum, left upper cutaneous lip after the risks, complications, and alternatives were explained to the patient, including risk of blister formation, discomfort, scar, hypopigmentation, patient elected to proceed with cryotherapy. Lesion(s) were treated w/ liquid nitrogen using a Cryogun. 1 freeze thaw cycle was performed with a freeze time of 1-5 seconds. There were no immediate complications. Wound care instructions were discussed with the patient. Recheck 6 weeks to ensure resolution   2. Benign nevus - Benign acquired melanocytic nevi  -Recommend monthly self skin exams   -Educated regarding the ABCDEs of melanoma detection   -Call for any new/changing moles or concerning lesions  -Reviewed sun protective behavior -- sun avoidance during the peak hours of the day, sun-protective clothing (including hat and sunglasses), sunscreen use (water resistant, broad spectrum, SPF at least 30, need for reapplication every 2 to 3 hours), avoidance of tanning beds      3. Seborrheic keratoses - Discussed underlying nature of seborrheic keratosis and low risk of malignancy. Treatment reserved for lesions that are itching, bleeding, growing or otherwise becoming bothersome. Discussed monitoring for change with reevaluation for changing lesions. 4. Papule-superior gluteal crease-recommended biopsy however patient developed chest discomfort-defer until follow-up in 6 weeks   5. History of nonmelanoma skin cancer - No evidence of recurrence. Discussed risk of subsequent skin cancers and increased risk of melanoma. Reviewed importance of monitoring skin for change and sun protection with hats and sunscreen, as well as sun avoidance.      After development of chest discomfort, gave patient the option of calling her primary care doctor in our office for advice versus proceeding directly to the ER-nurse stayed with patient while she called primary care doctor who wanted to see her in their office-she was in no acute distress and left our office per primary care doctor's recommendation.

## 2023-06-27 ENCOUNTER — OFFICE VISIT (OUTPATIENT)
Dept: DERMATOLOGY | Age: 83
End: 2023-06-27
Payer: MEDICARE

## 2023-06-27 DIAGNOSIS — Z85.828 HISTORY OF NONMELANOMA SKIN CANCER: ICD-10-CM

## 2023-06-27 DIAGNOSIS — L57.0 AK (ACTINIC KERATOSIS): Primary | ICD-10-CM

## 2023-06-27 DIAGNOSIS — L57.8 DIFFUSE PHOTODAMAGE OF SKIN: ICD-10-CM

## 2023-06-27 PROCEDURE — 17000 DESTRUCT PREMALG LESION: CPT | Performed by: DERMATOLOGY

## 2023-06-27 PROCEDURE — 17003 DESTRUCT PREMALG LES 2-14: CPT | Performed by: DERMATOLOGY

## 2023-06-27 PROCEDURE — G8400 PT W/DXA NO RESULTS DOC: HCPCS | Performed by: DERMATOLOGY

## 2023-06-27 PROCEDURE — 1036F TOBACCO NON-USER: CPT | Performed by: DERMATOLOGY

## 2023-06-27 PROCEDURE — 1090F PRES/ABSN URINE INCON ASSESS: CPT | Performed by: DERMATOLOGY

## 2023-06-27 PROCEDURE — G8420 CALC BMI NORM PARAMETERS: HCPCS | Performed by: DERMATOLOGY

## 2023-06-27 PROCEDURE — G8427 DOCREV CUR MEDS BY ELIG CLIN: HCPCS | Performed by: DERMATOLOGY

## 2023-06-27 PROCEDURE — 99213 OFFICE O/P EST LOW 20 MIN: CPT | Performed by: DERMATOLOGY

## 2023-06-27 PROCEDURE — 1123F ACP DISCUSS/DSCN MKR DOCD: CPT | Performed by: DERMATOLOGY

## 2023-10-03 ENCOUNTER — OFFICE VISIT (OUTPATIENT)
Dept: DERMATOLOGY | Age: 83
End: 2023-10-03
Payer: MEDICARE

## 2023-10-03 DIAGNOSIS — L57.8 DIFFUSE PHOTODAMAGE OF SKIN: ICD-10-CM

## 2023-10-03 DIAGNOSIS — Z85.828 HISTORY OF NONMELANOMA SKIN CANCER: ICD-10-CM

## 2023-10-03 DIAGNOSIS — L57.0 AK (ACTINIC KERATOSIS): Primary | ICD-10-CM

## 2023-10-03 PROCEDURE — 17000 DESTRUCT PREMALG LESION: CPT | Performed by: DERMATOLOGY

## 2023-10-03 PROCEDURE — G8484 FLU IMMUNIZE NO ADMIN: HCPCS | Performed by: DERMATOLOGY

## 2023-10-03 PROCEDURE — 1090F PRES/ABSN URINE INCON ASSESS: CPT | Performed by: DERMATOLOGY

## 2023-10-03 PROCEDURE — G8427 DOCREV CUR MEDS BY ELIG CLIN: HCPCS | Performed by: DERMATOLOGY

## 2023-10-03 PROCEDURE — G8400 PT W/DXA NO RESULTS DOC: HCPCS | Performed by: DERMATOLOGY

## 2023-10-03 PROCEDURE — 1036F TOBACCO NON-USER: CPT | Performed by: DERMATOLOGY

## 2023-10-03 PROCEDURE — 99212 OFFICE O/P EST SF 10 MIN: CPT | Performed by: DERMATOLOGY

## 2023-10-03 PROCEDURE — 17003 DESTRUCT PREMALG LES 2-14: CPT | Performed by: DERMATOLOGY

## 2023-10-03 PROCEDURE — G8420 CALC BMI NORM PARAMETERS: HCPCS | Performed by: DERMATOLOGY

## 2023-10-03 PROCEDURE — 1123F ACP DISCUSS/DSCN MKR DOCD: CPT | Performed by: DERMATOLOGY

## 2023-10-03 RX ORDER — CLOBETASOL PROPIONATE 0.5 MG/G
OINTMENT TOPICAL 2 TIMES DAILY
COMMUNITY
Start: 2023-08-14

## 2023-10-03 NOTE — PROGRESS NOTES
[facility]) Dermatology  Kimbelry Magana M.D.  [phone]       Sarah Riddle  1940    79 y.o. female     Date of Visit: 10/3/2023    Chief Complaint:   Chief Complaint   Patient presents with    Skin Lesion        I was asked to see this patient by Dr. Garcia ref. provider found. History of Present Illness:  1. Patient presents today for evaluation of a new scaly papule left upper cutaneous lip. Dry. Present about 2 months. Not itching, bleeding, growing. Photodamage. Progressive freckling and lentigines of sun exposed areas. Patient is wearing hats and sunscreen consistently. Of note, patient unhappy with various aspects of care from all 3 prior dermatologists-scars mention previously in my notes, approach to hair loss, lack of photographs, perceived lack of treatment options all mentioned 11/22. Skin history:  3 prior nonmelanoma skin cancers-forehead, left upper lip, left thigh     Unhappy with scar left upper lip-feels that it changed the contour of her lip. Consider plastics for further excisions     AK- LN2     Vascular rosacea. Has tried multiple topicals. Never had laser     Hair loss-previously diagnosed as lichen planopilaris. Previous treatments included intralesional Kenalog, Protopic, Elidel. Was seen by ophthalmology for pre-Plaquenil eye exam and thought not to be a good candidate due to early macular degeneration (see summary 10/21)     Former patient of Cathie Castro and Elijah Mccormick    Review of Systems:  Constitutional: Reports general sense of well-being       Past Medical History, Surgical History, Family History, Medications and Allergies reviewed.     Social History:   Social History     Socioeconomic History    Marital status:      Spouse name: Not on file    Number of children: Not on file    Years of education: Not on file    Highest education level: Not on file   Occupational History    Not on file   Tobacco Use    Smoking status: Never    Smokeless tobacco:

## 2023-10-31 ENCOUNTER — HOSPITAL ENCOUNTER (OUTPATIENT)
Dept: MAMMOGRAPHY | Age: 83
Discharge: HOME | End: 2023-10-31
Payer: MEDICARE

## 2023-10-31 VITALS — BODY MASS INDEX: 19.32 KG/M2 | WEIGHT: 105 LBS | HEIGHT: 62 IN

## 2023-10-31 DIAGNOSIS — Z12.31 VISIT FOR SCREENING MAMMOGRAM: ICD-10-CM

## 2023-10-31 PROCEDURE — 77063 BREAST TOMOSYNTHESIS BI: CPT

## 2024-04-29 ENCOUNTER — TELEPHONE (OUTPATIENT)
Dept: DERMATOLOGY | Age: 84
End: 2024-04-29

## 2024-04-29 NOTE — TELEPHONE ENCOUNTER
Pt has what she described as \"abrasions\" on her face. States history of skin cancer but said  treats. She is asking if she should be seen before next appt on 6/19. I scheduled her for 5/28. Is that soon enough? I did not cancel her appt for 6/19 as that is a 6 month check.    [phone]

## 2024-04-30 ENCOUNTER — TELEPHONE (OUTPATIENT)
Dept: DERMATOLOGY | Age: 84
End: 2024-04-30

## 2024-04-30 NOTE — TELEPHONE ENCOUNTER
Patient scheduled for 5/1/24  at 8:30 am for new bumps on face. Appointment kept for TBSE on 6/19/24 at 10:15 am.

## 2024-05-01 ENCOUNTER — OFFICE VISIT (OUTPATIENT)
Dept: DERMATOLOGY | Age: 84
End: 2024-05-01
Payer: MEDICARE

## 2024-05-01 DIAGNOSIS — L57.0 AK (ACTINIC KERATOSIS): Primary | ICD-10-CM

## 2024-05-01 DIAGNOSIS — L57.0 ACTINIC KERATOSIS TREATED WITH TOPICAL FLUOROURACIL (5FU): ICD-10-CM

## 2024-05-01 DIAGNOSIS — L57.8 DIFFUSE PHOTODAMAGE OF SKIN: ICD-10-CM

## 2024-05-01 DIAGNOSIS — Z85.828 HISTORY OF NONMELANOMA SKIN CANCER: ICD-10-CM

## 2024-05-01 PROCEDURE — 17003 DESTRUCT PREMALG LES 2-14: CPT | Performed by: DERMATOLOGY

## 2024-05-01 PROCEDURE — 1090F PRES/ABSN URINE INCON ASSESS: CPT | Performed by: DERMATOLOGY

## 2024-05-01 PROCEDURE — 17000 DESTRUCT PREMALG LESION: CPT | Performed by: DERMATOLOGY

## 2024-05-01 PROCEDURE — G8420 CALC BMI NORM PARAMETERS: HCPCS | Performed by: DERMATOLOGY

## 2024-05-01 PROCEDURE — G8400 PT W/DXA NO RESULTS DOC: HCPCS | Performed by: DERMATOLOGY

## 2024-05-01 PROCEDURE — G8427 DOCREV CUR MEDS BY ELIG CLIN: HCPCS | Performed by: DERMATOLOGY

## 2024-05-01 PROCEDURE — 1036F TOBACCO NON-USER: CPT | Performed by: DERMATOLOGY

## 2024-05-01 PROCEDURE — 99214 OFFICE O/P EST MOD 30 MIN: CPT | Performed by: DERMATOLOGY

## 2024-05-01 PROCEDURE — 1123F ACP DISCUSS/DSCN MKR DOCD: CPT | Performed by: DERMATOLOGY

## 2024-05-01 NOTE — PROGRESS NOTES
Smokeless tobacco: Never   Vaping Use    Vaping Use: Never used   Substance and Sexual Activity    Alcohol use: Yes    Drug use: Never    Sexual activity: Not on file   Other Topics Concern    Not on file   Social History Narrative    Not on file     Social Determinants of Health     Financial Resource Strain: Not on file   Food Insecurity: Not on file   Transportation Needs: Not on file   Physical Activity: Not on file   Stress: Not on file   Social Connections: Not on file   Intimate Partner Violence: Not on file   Housing Stability: Not on file       Physical Examination       -General: Well-appearing, NAD  1.  Diffuse background photodamage with freckling and lentigines.  Gritty erythematous excoriated papule left eyebrow.  More diffuse actinic change nasal dorsum with associated discrete gritty erythematous papule.  Gritty erythematous papule left lip extending onto vermilion      Assessment and Plan     1. AK (actinic keratosis) treated with fluorouracil-reviewed treatment options with patient-discussed fluorouracil field therapy for nasal dorsum-she will have difficulty staying out of the sun this summer, could consider this over the winter.    Actinic keratosis-3-left eyebrow, left upper lip at vermilion, nasal dorsum after the risks, complications, and alternatives were explained to the patient, including risk of blister formation, discomfort, scar, hypopigmentation, patient elected to proceed with cryotherapy. Lesion(s) were treated w/ liquid nitrogen using a Cryogun. 1 freeze thaw cycle was performed with a freeze time of 1-5 seconds.  There were no immediate complications.  Wound care instructions were discussed with the patient.     2. Diffuse photodamage of skin - Discussed changes in skin from chronic UV exposure and ways to mitigate further damage- hats when outside, SPF 50 or higher that is reapplied regularly, daily SPF application, UV protective clothing, and sun avoidance.     3. History of

## 2024-06-19 ENCOUNTER — OFFICE VISIT (OUTPATIENT)
Dept: DERMATOLOGY | Age: 84
End: 2024-06-19
Payer: MEDICARE

## 2024-06-19 DIAGNOSIS — L57.0 AK (ACTINIC KERATOSIS): Primary | ICD-10-CM

## 2024-06-19 DIAGNOSIS — L57.8 DIFFUSE PHOTODAMAGE OF SKIN: ICD-10-CM

## 2024-06-19 DIAGNOSIS — L85.3 XEROSIS CUTIS: ICD-10-CM

## 2024-06-19 DIAGNOSIS — Z85.828 HISTORY OF NONMELANOMA SKIN CANCER: ICD-10-CM

## 2024-06-19 PROCEDURE — G8420 CALC BMI NORM PARAMETERS: HCPCS | Performed by: DERMATOLOGY

## 2024-06-19 PROCEDURE — G8427 DOCREV CUR MEDS BY ELIG CLIN: HCPCS | Performed by: DERMATOLOGY

## 2024-06-19 PROCEDURE — 1036F TOBACCO NON-USER: CPT | Performed by: DERMATOLOGY

## 2024-06-19 PROCEDURE — 17000 DESTRUCT PREMALG LESION: CPT | Performed by: DERMATOLOGY

## 2024-06-19 PROCEDURE — 1090F PRES/ABSN URINE INCON ASSESS: CPT | Performed by: DERMATOLOGY

## 2024-06-19 PROCEDURE — 1123F ACP DISCUSS/DSCN MKR DOCD: CPT | Performed by: DERMATOLOGY

## 2024-06-19 PROCEDURE — G8400 PT W/DXA NO RESULTS DOC: HCPCS | Performed by: DERMATOLOGY

## 2024-06-19 PROCEDURE — 99213 OFFICE O/P EST LOW 20 MIN: CPT | Performed by: DERMATOLOGY

## 2024-06-19 NOTE — PROGRESS NOTES
[facility] Dermatology  Yandy Ramírez M.D.  [phone]       Shahla Pate  1940    83 y.o. female     Date of Visit: 6/19/2024    Chief Complaint:   Chief Complaint   Patient presents with    Skin Lesion        I was asked to see this patient by Dr. Garcia ref. provider found.    History of Present Illness:  1. TBSE    Multiple nevi. Patient has not noticed any new or changing pigmented lesions.   Stable in size, shape, color. Not itching, bleeding.     Actinic keratoses treated previously healed well.    Still having difficulty with xerosis-chest, upper back.  Does use a thicker moisturizer which is helpful.    Of note, patient unhappy with various aspects of care from all 3 prior dermatologists-scars mentioned previously in my notes, approach to hair loss, lack of photographs, perceived lack of treatment options all mentioned 11/22.     Skin history:  3 prior nonmelanoma skin cancers-forehead, left upper lip, left thigh     Unhappy with scar left upper lip-feels that it changed the contour of her lip.  Consider plastics for further excisions     AK- LN2     Vascular rosacea.  Has tried multiple topicals.  Never had laser     Hair loss-previously diagnosed as lichen planopilaris.  Previous treatments included intralesional Kenalog, Protopic, Elidel.  Was seen by ophthalmology for pre-Plaquenil eye exam and thought not to be a good candidate due to early macular degeneration (see summary 10/21)     Former patient of Natalya Varela and Ramirez Velez    Review of Systems:  Constitutional: Reports general sense of well-being       Past Medical History, Surgical History, Family History, Medications and Allergies reviewed.    Social History:   Social History     Socioeconomic History    Marital status:      Spouse name: Not on file    Number of children: Not on file    Years of education: Not on file    Highest education level: Not on file   Occupational History    Not on file   Tobacco Use    Smoking status: Never

## 2024-11-12 ENCOUNTER — HOSPITAL ENCOUNTER (OUTPATIENT)
Dept: MAMMOGRAPHY | Age: 84
Discharge: HOME | End: 2024-11-12
Payer: MEDICARE

## 2024-11-12 VITALS — WEIGHT: 107 LBS | BODY MASS INDEX: 19.69 KG/M2 | HEIGHT: 62 IN

## 2024-11-12 DIAGNOSIS — Z12.31 VISIT FOR SCREENING MAMMOGRAM: ICD-10-CM

## 2024-11-12 PROCEDURE — 77063 BREAST TOMOSYNTHESIS BI: CPT

## 2024-12-18 ENCOUNTER — OFFICE VISIT (OUTPATIENT)
Age: 84
End: 2024-12-18
Payer: MEDICARE

## 2024-12-18 DIAGNOSIS — D22.9 BENIGN NEVUS: ICD-10-CM

## 2024-12-18 DIAGNOSIS — Z85.828 HISTORY OF NONMELANOMA SKIN CANCER: ICD-10-CM

## 2024-12-18 DIAGNOSIS — L89.151 PRESSURE INJURY OF SACRAL REGION, STAGE 1: ICD-10-CM

## 2024-12-18 DIAGNOSIS — D48.5 NEOPLASM OF UNCERTAIN BEHAVIOR OF SKIN: Primary | ICD-10-CM

## 2024-12-18 PROCEDURE — G8420 CALC BMI NORM PARAMETERS: HCPCS | Performed by: DERMATOLOGY

## 2024-12-18 PROCEDURE — 1159F MED LIST DOCD IN RCRD: CPT | Performed by: DERMATOLOGY

## 2024-12-18 PROCEDURE — 1160F RVW MEDS BY RX/DR IN RCRD: CPT | Performed by: DERMATOLOGY

## 2024-12-18 PROCEDURE — 1123F ACP DISCUSS/DSCN MKR DOCD: CPT | Performed by: DERMATOLOGY

## 2024-12-18 PROCEDURE — G8484 FLU IMMUNIZE NO ADMIN: HCPCS | Performed by: DERMATOLOGY

## 2024-12-18 PROCEDURE — 11102 TANGNTL BX SKIN SINGLE LES: CPT | Performed by: DERMATOLOGY

## 2024-12-18 PROCEDURE — G8400 PT W/DXA NO RESULTS DOC: HCPCS | Performed by: DERMATOLOGY

## 2024-12-18 PROCEDURE — 1090F PRES/ABSN URINE INCON ASSESS: CPT | Performed by: DERMATOLOGY

## 2024-12-18 PROCEDURE — G8427 DOCREV CUR MEDS BY ELIG CLIN: HCPCS | Performed by: DERMATOLOGY

## 2024-12-18 PROCEDURE — 1036F TOBACCO NON-USER: CPT | Performed by: DERMATOLOGY

## 2024-12-18 PROCEDURE — 99213 OFFICE O/P EST LOW 20 MIN: CPT | Performed by: DERMATOLOGY

## 2024-12-18 NOTE — PROGRESS NOTES
[facility] Dermatology  Yandy Ramírez M.D.  [phone]       Shahla Pate  1940    84 y.o. female     Date of Visit: 12/18/2024    Chief Complaint:   Chief Complaint   Patient presents with    Skin Lesion        I was asked to see this patient by Dr. Garcia ref. provider found.    History of Present Illness:  1. TBSE    Multiple nevi. Patient has not noticed any new or changing pigmented lesions.   Stable in size, shape, color. Not itching, bleeding.     Early sacral decubitus pressure ulcer-lesion is not ulcerated but sometimes becomes tender-finds that this flares when she has been in 1 position overnight-sleeps on her back.  Aware of the need to offload pressure but this is difficult when she is sleeping.    Of note, patient unhappy with various aspects of care from all 3 prior dermatologists-scars mentioned previously in my notes, approach to hair loss, lack of photographs, perceived lack of treatment options all mentioned 11/22.     Skin history:  3 prior nonmelanoma skin cancers-forehead, left upper lip, left thigh     Unhappy with scar left upper lip-feels that it changed the contour of her lip.       AK- LN2     Vascular rosacea.  Has tried multiple topicals.  Never had laser     Hair loss-previously diagnosed as lichen planopilaris.  Previous treatments included intralesional Kenalog, Protopic, Elidel.  Was seen by ophthalmology for pre-Plaquenil eye exam and thought not to be a good candidate due to early macular degeneration (see summary 10/21)     Former patient of Natalya Varela and Ramirez Velez    Review of Systems:  Constitutional: Reports general sense of well-being       Past Medical History, Surgical History, Family History, Medications and Allergies reviewed.    Social History:   Social History     Socioeconomic History    Marital status:      Spouse name: Not on file    Number of children: Not on file    Years of education: Not on file    Highest education level: Not on file   Occupational

## 2024-12-23 LAB — DERMATOLOGY PATHOLOGY REPORT: ABNORMAL

## 2025-01-09 ENCOUNTER — OFFICE VISIT (OUTPATIENT)
Age: 85
End: 2025-01-09

## 2025-01-09 DIAGNOSIS — D04.61 SQUAMOUS CELL CARCINOMA IN SITU (SCCIS) OF SKIN OF RIGHT UPPER ARM: Primary | ICD-10-CM

## 2025-01-09 NOTE — PROGRESS NOTES
[facility] Dermatology  Yandy Ramírez M.D.  [phone]       Shahla Pate  1940    84 y.o. female     Date of Visit: 1/9/2025    Chief Complaint:   Chief Complaint   Patient presents with    Skin Lesion        I was asked to see this patient by Dr. Garcia ref. provider found.    History of Present Illness:  1.  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  Healed without difficulty.        Of note, patient unhappy with various aspects of care from all 3 prior dermatologists-scars mentioned previously in my notes, approach to hair loss, lack of photographs, perceived lack of treatment options all mentioned 11/22.     Skin history:  3 prior nonmelanoma skin cancers-forehead, left upper lip, left thigh     Unhappy with scar left upper lip-feels that it changed the contour of her lip.       AK- LN2     Vascular rosacea.  Has tried multiple topicals.  Never had laser     Hair loss-previously diagnosed as lichen planopilaris.  Previous treatments included intralesional Kenalog, Protopic, Elidel.  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    Social History:   Social History     Socioeconomic History    Marital status:      Spouse name: Not on file    Number of children: Not on file    Years of education: Not on file    Highest education level: Not on file   Occupational History    Not on file   Tobacco Use    Smoking status: Never    Smokeless tobacco: Never   Vaping Use    Vaping status: Never Used   Substance and Sexual Activity    Alcohol use: Yes    Drug use: Never

## 2025-03-20 ENCOUNTER — TELEPHONE (OUTPATIENT)
Age: 85
End: 2025-03-20

## 2025-03-26 ENCOUNTER — OFFICE VISIT (OUTPATIENT)
Age: 85
End: 2025-03-26
Payer: MEDICARE

## 2025-03-26 DIAGNOSIS — L57.0 AK (ACTINIC KERATOSIS): ICD-10-CM

## 2025-03-26 DIAGNOSIS — D04.61 SQUAMOUS CELL CARCINOMA IN SITU (SCCIS) OF SKIN OF RIGHT UPPER ARM: ICD-10-CM

## 2025-03-26 DIAGNOSIS — D18.01 HEMANGIOMA OF SKIN: Primary | ICD-10-CM

## 2025-03-26 PROCEDURE — 99212 OFFICE O/P EST SF 10 MIN: CPT | Performed by: DERMATOLOGY

## 2025-03-26 PROCEDURE — 1159F MED LIST DOCD IN RCRD: CPT | Performed by: DERMATOLOGY

## 2025-03-26 PROCEDURE — G8420 CALC BMI NORM PARAMETERS: HCPCS | Performed by: DERMATOLOGY

## 2025-03-26 PROCEDURE — 1036F TOBACCO NON-USER: CPT | Performed by: DERMATOLOGY

## 2025-03-26 PROCEDURE — 1123F ACP DISCUSS/DSCN MKR DOCD: CPT | Performed by: DERMATOLOGY

## 2025-03-26 PROCEDURE — 17000 DESTRUCT PREMALG LESION: CPT | Performed by: DERMATOLOGY

## 2025-03-26 PROCEDURE — 1160F RVW MEDS BY RX/DR IN RCRD: CPT | Performed by: DERMATOLOGY

## 2025-03-26 PROCEDURE — 99213 OFFICE O/P EST LOW 20 MIN: CPT | Performed by: DERMATOLOGY

## 2025-03-26 PROCEDURE — G8400 PT W/DXA NO RESULTS DOC: HCPCS | Performed by: DERMATOLOGY

## 2025-03-26 PROCEDURE — 1090F PRES/ABSN URINE INCON ASSESS: CPT | Performed by: DERMATOLOGY

## 2025-03-26 PROCEDURE — G8427 DOCREV CUR MEDS BY ELIG CLIN: HCPCS | Performed by: DERMATOLOGY

## 2025-03-26 NOTE — PROGRESS NOTES
[facility] Dermatology  Yandy Ramírez M.D.  [phone]       Shahla Pate  1940    84 y.o. female     Date of Visit: 3/26/2025    Chief Complaint:   Chief Complaint   Patient presents with    Skin Lesion     Below elbow, upper L thigh and L eyebrow        I was asked to see this patient by Dr. Garcia ref. provider found.    History of Present Illness:  1.  Patient presents today for new lesion    Newly noticed erythematous papule right dorsal forearm proximally.  Asymptomatic.  Not itching, bleeding.  Uncertain how long it has been present    Dry papule left mid eyebrow.  Asymptomatic.  Noticed this this morning    Well-healed scar right upper arm-patient happy with how she is healing after curettage of squamous cell carcinoma.  Still erythematous.    Skin history:  3 prior nonmelanoma skin cancers-forehead, left upper lip, left thigh     Unhappy with scar left upper lip-feels that it changed the contour of her lip.       AK- LN2     Vascular rosacea.  Has tried multiple topicals.  Never had laser     Hair loss-previously diagnosed as lichen planopilaris.  Previous treatments included intralesional Kenalog, Protopic, Elidel.  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    Social History:   Social History     Socioeconomic History    Marital status:      Spouse name: Not on file    Number of children: Not on file    Years of education: Not on file    Highest education level: Not on file   Occupational History    Not on file   Tobacco Use    Smoking status: Never    Smokeless tobacco: Never   Vaping

## 2025-06-17 ENCOUNTER — OFFICE VISIT (OUTPATIENT)
Age: 85
End: 2025-06-17
Payer: MEDICARE

## 2025-06-17 DIAGNOSIS — D22.9 BENIGN NEVUS: Primary | ICD-10-CM

## 2025-06-17 DIAGNOSIS — Z85.828 HISTORY OF NONMELANOMA SKIN CANCER: ICD-10-CM

## 2025-06-17 DIAGNOSIS — L82.1 SEBORRHEIC KERATOSES: ICD-10-CM

## 2025-06-17 PROCEDURE — 1036F TOBACCO NON-USER: CPT | Performed by: DERMATOLOGY

## 2025-06-17 PROCEDURE — G8420 CALC BMI NORM PARAMETERS: HCPCS | Performed by: DERMATOLOGY

## 2025-06-17 PROCEDURE — G8427 DOCREV CUR MEDS BY ELIG CLIN: HCPCS | Performed by: DERMATOLOGY

## 2025-06-17 PROCEDURE — 1159F MED LIST DOCD IN RCRD: CPT | Performed by: DERMATOLOGY

## 2025-06-17 PROCEDURE — 1090F PRES/ABSN URINE INCON ASSESS: CPT | Performed by: DERMATOLOGY

## 2025-06-17 PROCEDURE — 1160F RVW MEDS BY RX/DR IN RCRD: CPT | Performed by: DERMATOLOGY

## 2025-06-17 PROCEDURE — 99212 OFFICE O/P EST SF 10 MIN: CPT | Performed by: DERMATOLOGY

## 2025-06-17 PROCEDURE — G8400 PT W/DXA NO RESULTS DOC: HCPCS | Performed by: DERMATOLOGY

## 2025-06-17 PROCEDURE — 1123F ACP DISCUSS/DSCN MKR DOCD: CPT | Performed by: DERMATOLOGY

## 2025-06-17 PROCEDURE — 99213 OFFICE O/P EST LOW 20 MIN: CPT | Performed by: DERMATOLOGY

## 2025-06-17 RX ORDER — MELOXICAM 15 MG/1
15 TABLET ORAL DAILY
COMMUNITY
Start: 2025-05-27

## 2025-06-17 NOTE — PROGRESS NOTES
[facility] Dermatology  Yandy Ramírez M.D.  [phone]       Shahla Pate  1940    84 y.o. female     Date of Visit: 6/17/2025    Chief Complaint:   Chief Complaint   Patient presents with    Skin Lesion        I was asked to see this patient by Dr. Garcia ref. provider found.    History of Present Illness:  1. TBSE    Multiple nevi. Patient has not noticed any new or changing pigmented lesions.   Stable in size, shape, color. Not itching, bleeding.     Seborrheic keratoses.  Increasing number of hyperkeratotic stuck on papules and plaques over the torso.  No discrete lesion itching, bleeding, becoming symptomatic.     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       Skin history:  3 prior nonmelanoma skin cancers-forehead, left upper lip, left thigh     Unhappy with scar left upper lip-feels that it changed the contour of her lip.       AK- LN2     Vascular rosacea.  Has tried multiple topicals.  Never had laser     Hair loss-previously diagnosed as lichen planopilaris.  Previous treatments included intralesional Kenalog, Protopic, Elidel.  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    Social History:   Social History     Socioeconomic History    Marital status:      Spouse name: Not on file    Number of children: Not on file    Years of education: Not on file    Highest education level: Not on file   Occupational History    Not on file   Tobacco Use    Smoking status: Never    Smokeless tobacco: Never   Vaping Use    Vaping status: Never Used

## 2025-08-14 ENCOUNTER — TELEPHONE (OUTPATIENT)
Age: 85
End: 2025-08-14